# Patient Record
Sex: FEMALE | Race: WHITE | Employment: OTHER | URBAN - METROPOLITAN AREA
[De-identification: names, ages, dates, MRNs, and addresses within clinical notes are randomized per-mention and may not be internally consistent; named-entity substitution may affect disease eponyms.]

---

## 2018-07-27 PROBLEM — K43.9 EPIGASTRIC HERNIA: Status: ACTIVE | Noted: 2017-10-23

## 2018-07-27 PROBLEM — M81.0 AGE-RELATED OSTEOPOROSIS WITHOUT CURRENT PATHOLOGICAL FRACTURE: Status: ACTIVE | Noted: 2018-07-27

## 2018-07-27 PROBLEM — F90.2 ATTENTION DEFICIT HYPERACTIVITY DISORDER (ADHD), COMBINED TYPE: Status: ACTIVE | Noted: 2018-07-27

## 2018-07-27 PROBLEM — K58.0 IRRITABLE BOWEL SYNDROME WITH DIARRHEA: Status: ACTIVE | Noted: 2018-07-27

## 2018-07-27 PROBLEM — M51.36 DDD (DEGENERATIVE DISC DISEASE), LUMBAR: Status: ACTIVE | Noted: 2018-07-27

## 2018-07-27 PROBLEM — E53.8 B12 DEFICIENCY: Status: ACTIVE | Noted: 2018-07-27

## 2018-07-27 PROBLEM — M81.0 AGE-RELATED OSTEOPOROSIS WITHOUT CURRENT PATHOLOGICAL FRACTURE: Status: RESOLVED | Noted: 2018-07-27 | Resolved: 2018-07-27

## 2018-07-27 PROBLEM — M50.30 DDD (DEGENERATIVE DISC DISEASE), CERVICAL: Status: ACTIVE | Noted: 2018-07-27

## 2018-07-27 PROBLEM — J45.20 MILD INTERMITTENT ASTHMA WITHOUT COMPLICATION: Status: ACTIVE | Noted: 2018-07-27

## 2020-09-16 ENCOUNTER — APPOINTMENT (OUTPATIENT)
Dept: PHYSICAL THERAPY | Age: 65
End: 2020-09-16

## 2020-09-29 ENCOUNTER — APPOINTMENT (OUTPATIENT)
Dept: PHYSICAL THERAPY | Age: 65
End: 2020-09-29

## 2020-10-13 ENCOUNTER — HOSPITAL ENCOUNTER (OUTPATIENT)
Dept: PHYSICAL THERAPY | Age: 65
Discharge: HOME | End: 2020-10-13
Payer: MEDICARE

## 2020-10-13 PROCEDURE — 97161 PT EVAL LOW COMPLEX 20 MIN: CPT

## 2020-10-13 NOTE — THERAPY EVALUATION
Lola Almanza : 1955 [place] Secondary:  Therapy Center at [address] [place] [address] [address] [facility], [address] [place] [address],[address], [address] [facility] [phone]   [phone] OUTPATIENT PHYSICAL THERAPY:Initial Assessment 10/13/2020 ICD-10: Treatment Diagnosis: Pain in right knee (M25.561), Pain in left knee (M25.562) Precautions/Allergies: per EMR, allergic to Benadryl and Tramadol TREATMENT PLAN: 
Effective Dates: 10/13/2020 TO 2021 (90 days). Frequency/Duration: 2 times a week for 90 Day(s) MEDICAL/REFERRING DIAGNOSIS: 
R knee pain, L knee pain DATE OF ONSET: years ago REFERRING PHYSICIAN: Maria Luz Strickland.MD RUFFIN Orders: Yaima Marie and treat for B knee arthritis Return MD Appointment: 20 INITIAL ASSESSMENT:  Ms. Georgette Quezada is a 75year old female with B knee pain. She reports she is sent to this clinic because it has a pool and she cannot do land PT. She presents with reports of extreme pain, she has decreased ROM in B knees and she has decreased strength in LEs. She uses a rolling walker and has decreased functional mobility. She could benefit from PT to address deficits and work toward goals. PROBLEM LIST (Impacting functional limitations): 1. Decreased Strength 2. Decreased Ambulation Ability/Technique 3. Increased Pain 4. Decreased Flexibility/Joint Mobility INTERVENTIONS PLANNED: (Treatment may consist of any combination of the following) 1. Home Exercise Program (HEP) 2. Therapeutic Exercise/Strengthening 3. aquatic PT  
 
GOALS: (Goals have been discussed and agreed upon with patient.) Patient's stated goal is to reduce pain to \"4 or 5 without pain meds\". Short-Term Functional Goals: Time Frame: 6 weeks 1. Patient to be independent with HEP 2. Patient to tolerate 45 minute aquatic PT sessions Discharge Goals: Time Frame: 90 days 1. Patient to report pain as less than 5 without pain meds 2. Patient to improve LEFS score from 4 (at eval) to 20 to demo improved functional mobility 3. Patient to improve Oswestry score from 46 (at eval) to 30 OUTCOME MEASURE:  
Tool Used: Lower Extremity Functional Scale (LEFS) Score:  Initial: 4/80 Most Recent: X/80 (Date: -- ) Interpretation of Score: 20 questions each scored on a 5 point scale with 0 representing \"extreme difficulty or unable to perform\" and 4 representing \"no difficulty\". The lower the score, the greater the functional disability. 80/80 represents no disability. Minimal detectable change is 9 points. Tool Used: Modified Oswestry Low Back Pain Questionnaire Score:  Initial: 46/50  Most Recent: X/50 (Date: -- ) Interpretation of Score: Each section is scored on a 0-5 scale, 5 representing the greatest disability. The scores of each section are added together for a total score of 50. MEDICAL NECESSITY:  
· Patient demonstrates fair rehab potential due to higher previous functional level. REASON FOR SERVICES/OTHER COMMENTS: 
· Patient continues to require skilled intervention due to desire to decrease pain levels. Total Duration: 30 minutes PT Patient Time In/Time Out Time In: 2198 Time Out: 1625 Rehabilitation Potential For Stated Goals: Fair Regarding Mary Lagunas's therapy, I certify that the treatment plan above will be carried out by a therapist or under their direction. Thank you for this referral, 
 
Joseph Lama, PT Referring Physician Signature: Wilton Mccray MD _______________________________ Date _____________ PAIN/SUBJECTIVE:  
Initial: Pain Intensity 1: 5(5 currently on meds. 10 without meds)   Post Session:  Not rated at end of session HISTORY:  
History of Injury/Illness (Reason for Referral): 
Patient reports she has B knee pain and back pain. She ws to have a R TKA, but fell and shattered L knee. Reports she falls often.   She has a cast shoe on R foot due to broken foot but said MD allows her to take it off for aquatic PT. She had aquatic PT at [facility] for her back in the past.   
Past Medical History/Comorbidities: from EMR: Anxiety, Arthritis, Bipolar disorder, Chronic pain, Claustrophobia, Fibromyalgia, GERD, Hepatitis C, chronic, Psoriasis, Seizures, hx tumor removal; fracture, lap cholecystectomy; neurological procedure unlisted; cyst removal; mariela and bso; cervical diskectomy and endoscopy  ). Social History/Living Environment:  
  lives alone. Has care giver for 4 to 5 hours a few days per week. No steps. Prior Level of Function/Work/Activity: On disability Previous Treatment Approaches:   
      Had aquatic PT in the past for her back. Ambulatory/Rehab Services H2 Model Falls Risk Assessment Risk Factors: 
     (2)  Symptomatic Depression (1)  Dizziness/Vertigo (1)  Any administered benzodiazepines 
     (5)  History of Recent Falls [w/in 3 months] 
     (1)  Orthostatic drop in BP Ability to Rise from Chair: 
     (3)  Multiple attempts, but successful Falls Prevention Plan: 
     Physical Limitations to Exercise (specify):  monitor balance closely due to falls Mobility Assistance Device (specify):  rolling walker Total: (5 or greater = High Risk): 13  
©2010 [facility] . [facility] States Patent #[id number]. Federal Law prohibits the replication, distribution or use without written permission from [facility] Current Medications: per patient, EMR should be correct   buPROPion SR (Annika Hager)   dextroamphetamine-amphetamine (ADDERALL)   FLUoxetine (PROZAC)   lamoTRIgine (LAMICTAL)   traZODone (DESYREL)   HYDROcodone-acetaminophen ([address])   cyclobenzaprine (FLEXERIL)   Dexlansoprazole (DEXILANT)   promethazine (PHENERGAN)   Cholecalciferol, Vitamin D3,   
Gabriella Richi Date Last Reviewed:  10/13/20 Number of Personal Factors/Comorbidities that affect the Plan of Care: 1-2: MODERATE COMPLEXITY EXAMINATION:  
 
Observation/Orthostatic Postural Assessment: patient to PT with rolling walker. Cast shoe on R foot. Palpation: tender to touch B knees ROM:  AROM R knee flex 100, extn 0; L knee flex 110, extn -2 Strength: Difficult to accurately assess LE strength since pain was reported with everything. Neurological Screen: light touch intact in B LEs Functional Mobility:  Sit to/from Stand: with SBA. Transfer to mat with SBA. Ambulation with SBA. Balance:  Not tested but she reports poor balance and a history of frequent falls. Body Structures Involved: 1. Joints 2. Muscles Body Functions Affected: 1. Neuromusculoskeletal 
2. Movement Related Activities and Participation Affected: 1. General Tasks and Demands 2. Community, Social and [place] Number of elements (examined above) that affect the Plan of Care: 1-2: LOW COMPLEXITY CLINICAL PRESENTATION:  
Presentation: Evolving clinical presentation with changing clinical characteristics: MODERATE COMPLEXITY CLINICAL DECISION MAKING:  
Use of outcome tool(s) and clinical judgement create a POC that gives a: Difficult prediction of patient's progress: HIGH COMPLEXITY

## 2020-10-15 ENCOUNTER — APPOINTMENT (OUTPATIENT)
Dept: PHYSICAL THERAPY | Age: 65
End: 2020-10-15
Payer: MEDICARE

## 2020-10-19 ENCOUNTER — HOSPITAL ENCOUNTER (OUTPATIENT)
Dept: PHYSICAL THERAPY | Age: 65
Discharge: HOME | End: 2020-10-19
Payer: MEDICARE

## 2020-10-19 PROCEDURE — 97113 AQUATIC THERAPY/EXERCISES: CPT

## 2020-10-19 NOTE — PROGRESS NOTES
Hermelinda Garcia  : 1955  [place] / Product Type: Medicare /  [address] at [facility] MÓNICA ANSARI  [address] [place] [address], [address], [address] [place] [address]  [phone]   [phone]       OUTPATIENT PHYSICAL THERAPY: Daily Treatment Note 10/19/2020  Visit Count: 2     ICD-10: Treatment Diagnosis: Pain in right knee (M25.561), Pain in left knee (M25.562)   Precautions/Allergies: per EMR  Benadryl [diphenhydramine hcl] and Tramadol   TREATMENT PLAN:  Effective Dates: 10/19/2020 TO 10/13/2020 TO 2021 (90 days). .  Frequency/Duration:   2 times a week for 90 Day(s) Day(s) MEDICAL/REFERRING DIAGNOSIS:  Fibromyalgia R knee pain, L knee pain  DATE OF ONSET: years ago  REFERRING PHYSICIAN: Aldair Warren MD MD Orders: Rigoberto Linares and treat for B knee arthritis  Return MD Appointment: 20       Pre-treatment Symptoms/Complaints: Patient reports having more pain in back today. Pain: Initial: Pain Intensity 1: 5  Post Session: 5/10   Medications Last Reviewed:  10/13/20    Updated Objective Findings:   None Today     TREATMENT:   Aquatic Therapy (45 minutes): Aquatic treatment performed per flow grid for Ease of movement, Low impact and reduced weight bearing activity and increased pain level. .  Cues provided for proper posture. Assistance by therapist provided for lift chair. Patient has difficulty with prolonged walking, stairs and standing.      Aquatic Exercise Log       Date  10-19-20 Date   Date   Date   Date     Activity/ Exercise Parameters Parameters Parameters Parameters Parameters   Walking forward X 3       Walking backward        Walking sideways X 5         Marching X 2         Goose Step          Tip toes          Heels          Lunges        Side step squats        LE Exercises          Hip Flex/Ext 2 x 10         Hip Abd/Add 2 x 10         Hip flex/knee ext 2 x 10         Calf raises          Knee Flex          Squats          SLR 2 x 10         Step Ups        UE Exercises          Squeeze In          Push Down          Pull Down          Bicep/Tricep        Deep H2O/ Noodles          Stabilization          Arms only          Legs only        Reggie Xiao walk        Lower abdominal   work           Stretches          Hamstrings          Heelcords              HEP: None Today  [facility] Portal    Treatment/Session Summary:    · Response to Treatment:  Patient tolerated treatment well. Patient seemed pleased to be back in the pool. .  · Communication/Consultation:  None today  · Equipment provided today:  None today  · Recommendations/Intent for next treatment session: Next visit will focus on ambulation and balance.     Total Treatment Billable Duration:  45  PT Patient Time In/Time Out  Time In: 0400  Time Out: [address], PTA    Future Appointments   Date Time Provider Kimberly Juárez   10/21/2020  4:00 PM THIAGO GARCIA [facility]

## 2020-10-21 ENCOUNTER — HOSPITAL ENCOUNTER (OUTPATIENT)
Dept: PHYSICAL THERAPY | Age: 65
Discharge: HOME | End: 2020-10-21
Payer: MEDICARE

## 2020-10-21 PROCEDURE — 97113 AQUATIC THERAPY/EXERCISES: CPT

## 2020-10-21 NOTE — PROGRESS NOTES
Hermelinda Garcia  : 1955  [place] / Product Type: Medicare /  [address] at [facility] MÓNICA ANSARI  [address] [place] [address], [address], [place] [address].  [phone]   [phone]       OUTPATIENT PHYSICAL THERAPY: Daily Treatment Note 10/21/2020  Visit Count: 3     ICD-10: Treatment Diagnosis: Pain in right knee (M25.561), Pain in left knee (M25.562)   Precautions/Allergies: per EMR  Benadryl [diphenhydramine hcl] and Tramadol   TREATMENT PLAN:  Effective Dates: 10/19/2020 TO 10/13/2020 TO 2021 (90 days). .  Frequency/Duration:   2 times a week for 90 Day(s) Day(s) MEDICAL/REFERRING DIAGNOSIS:  Fibromyalgia R knee pain, L knee pain  DATE OF ONSET: years ago  REFERRING PHYSICIAN: Aldair Warren MD MD Orders: Rigoberto Linares and treat for B knee arthritis  Return MD Appointment: 20       Pre-treatment Symptoms/Complaints: Patient reports being sore after last treatment. She states mainly in her back. Pain: Initial: Pain Intensity 1: 5  Post Session: 5/10   Medications Last Reviewed:  10/13/20    Updated Objective Findings:   None Today     TREATMENT:   Aquatic Therapy (45 minutes): Aquatic treatment performed per flow grid for Ease of movement, Low impact and reduced weight bearing activity and increased pain level. .  Cues provided for proper posture. Assistance by therapist provided for lift chair. Patient has difficulty with prolonged walking, stairs and standing.      Aquatic Exercise Log       Date  10-19-20 Date  10-21-20 Date   Date   Date     Activity/ Exercise Parameters Parameters Parameters Parameters Parameters   Walking forward X 3 X 3      Walking backward        Walking sideways X 5 X 5        Marching X 2 x3        Goose Step          Tip toes          Heels          Lunges        Side step squats        LE Exercises          Hip Flex/Ext 2 x 10 2 x 10        Hip Abd/Add 2 x 10 2 x 10        Hip flex/knee ext 2 x 10 2 x 10 Calf raises          Knee Flex          Squats          SLR 2 x 10 2 x10        Step Ups        UE Exercises          Squeeze In          Push Down          Pull Down          Bicep/Tricep        Deep H2O/ Noodles          Stabilization          Arms only          Legs only        Reggie Xiao walk        Lower abdominal   work           Stretches          Hamstrings          Heelcords              HEP: None Today  [facility] Portal    Treatment/Session Summary:    · Response to Treatment:  Patient tolerated treatment with mild discomfort today. Patient indicated that she had to take more of her pain medication today due to the increased pain in her hip and back. Patient enjoys the pool. .  · Communication/Consultation:  None today  · Equipment provided today:  None today  · Recommendations/Intent for next treatment session: Next visit will focus on ambulation and balance. Total Treatment Billable Duration:  45  PT Patient Time In/Time Out  Time In: 0400  Time Out: [address], PTA    No future appointments.

## 2020-11-13 ENCOUNTER — APPOINTMENT (OUTPATIENT)
Dept: PHYSICAL THERAPY | Age: 65
End: 2020-11-13

## 2020-11-20 ENCOUNTER — APPOINTMENT (OUTPATIENT)
Dept: PHYSICAL THERAPY | Age: 65
End: 2020-11-20

## 2020-12-17 NOTE — THERAPY DISCHARGE
Emily Monroe : 1955 [place] Secondary:  Therapy Center at [facility] MÓNICA ANSARI [address] [facility], [address] [place] [address],[address], [place] [address]. [phone]   [phone] OUTPATIENT PHYSICAL THERAPY:Discontinuation Summary 2020 ICD-10: Treatment Diagnosis: Pain in right knee (M25.561), Pain in left knee (M25.562) Precautions/Allergies: per EMR, allergic to Benadryl and Tramadol TREATMENT PLAN: Discontinue PT Patient attended 2 PT sessions on 10/13/20 and 10/21/20. MEDICAL/REFERRING DIAGNOSIS: 
R knee pain, L knee pain DATE OF ONSET: years ago REFERRING PHYSICIAN: Kierra Keller MD MD Orders: Hieu Links and treat for B knee arthritis Return MD Appointment: 20 ASSESSMENT:  Ms. Rey Foley is a 75year old female with B knee pain. She reported she was sent to this clinic because it has a pool and she cannot do land PT. She presented with reports of extreme pain, she had decreased ROM in B knees and she had decreased strength in LEs. She used a rolling walker and had decreased functional mobility. She did not return to PT after 2 visits and she will now be discontinued from PT.   
 
GOALS: (Goals have been discussed and agreed upon with patient.) Unable to assess progress toward goals. Patient's stated goal is to reduce pain to \"4 or 5 without pain meds\". Short-Term Functional Goals: Time Frame: 6 weeks 1. Patient to be independent with HEP 2. Patient to tolerate 45 minute aquatic PT sessions Discharge Goals: Time Frame: 90 days 1. Patient to report pain as less than 5 without pain meds 2. Patient to improve LEFS score from 4 (at eval) to 20 to demo improved functional mobility 3. Patient to improve Oswestry score from 46 (at eval) to 30 OUTCOME MEASURE:  
Tool Used: Lower Extremity Functional Scale (LEFS) Score:  Initial:  Most Recent:  (Date: -- ) Interpretation of Score: 20 questions each scored on a 5 point scale with 0 representing \"extreme difficulty or unable to perform\" and 4 representing \"no difficulty\". The lower the score, the greater the functional disability. 80/80 represents no disability. Minimal detectable change is 9 points. Tool Used: Modified Oswestry Low Back Pain Questionnaire Score:  Initial: 46/50  Most Recent: X/50 (Date: -- ) Interpretation of Score: Each section is scored on a 0-5 scale, 5 representing the greatest disability. The scores of each section are added together for a total score of 50. From initial evaluation PAIN/SUBJECTIVE:  
Initial: Pain Intensity 1: 5(5 currently on meds. 10 without meds)   Post Session:  Not rated at end of session HISTORY:  
History of Injury/Illness (Reason for Referral): 
Patient reports she has B knee pain and back pain. She ws to have a R TKA, but fell and shattered L knee. Reports she falls often. She has a cast shoe on R foot due to broken foot but said MD allows her to take it off for aquatic PT. She had aquatic PT at [facility] for her back in the past.   
Past Medical History/Comorbidities: from EMR: Anxiety, Arthritis, Bipolar disorder, Chronic pain, Claustrophobia, Fibromyalgia, GERD, Hepatitis C, chronic, Psoriasis, Seizures, hx tumor removal; fracture, lap cholecystectomy; neurological procedure unlisted; cyst removal; mariela and bso; cervical diskectomy and endoscopy  ). Social History/Living Environment:  
  lives alone. Has care giver for 4 to 5 hours a few days per week. No steps. Prior Level of Function/Work/Activity: On disability Previous Treatment Approaches:   
      Had aquatic PT in the past for her back. Current Medications: per patient, EMR should be correct   buPROPion SR (Archie Ruffini)   dextroamphetamine-amphetamine (ADDERALL)   FLUoxetine (PROZAC)   lamoTRIgine (LAMICTAL)   traZODone (DESYREL)   HYDROcodone-acetaminophen ([address])   cyclobenzaprine (FLEXERIL)   Dexlansoprazole (DEXILANT)   promethazine (PHENERGAN)   Cholecalciferol, Vitamin D3,   
Travis Everettlulu Date Last Reviewed:  10/13/20 EXAMINATION:  
 
Observation/Orthostatic Postural Assessment: patient to PT with rolling walker. Cast shoe on R foot. Palpation: tender to touch B knees ROM:  AROM R knee flex 100, extn 0; L knee flex 110, extn -2 Strength: Difficult to accurately assess LE strength since pain was reported with everything. Neurological Screen: light touch intact in B LEs Functional Mobility:  Sit to/from Stand: with SBA. Transfer to mat with SBA. Ambulation with SBA. Balance:  Not tested but she reports poor balance and a history of frequent falls.

## 2021-03-23 ENCOUNTER — APPOINTMENT (OUTPATIENT)
Dept: PHYSICAL THERAPY | Age: 66
End: 2021-03-23
Payer: MEDICARE

## 2021-03-30 ENCOUNTER — HOSPITAL ENCOUNTER (OUTPATIENT)
Dept: PHYSICAL THERAPY | Age: 66
Discharge: HOME | End: 2021-03-30
Payer: MEDICARE

## 2021-03-30 PROCEDURE — 97162 PT EVAL MOD COMPLEX 30 MIN: CPT

## 2021-03-30 NOTE — THERAPY EVALUATION
Sara Clark : 1955 [place]: [place] [address]01 [facility], [address] [place] [address],[address], [facility] [address]. [phone]   [phone] OUTPATIENT PHYSICAL THERAPY:Initial Assessment 3/30/2021 ICD-10: Treatment Diagnosis: Gait instability R26.81, Pain in right knee (M25.561), Pain in left knee (M25.562) Precautions/Allergies: per EMR, allergic to Benadryl and Tramadol TREATMENT PLAN:  
Effective Dates: 3/30/2021 TO 2021 (90 days). Frequency/Duration: 1-2 times a week for 90 Day(s) MEDICAL/REFERRING DIAGNOSIS: 
gait DATE OF ONSET:  REFERRING PHYSICIAN: Wojciech Paiz NP MD Orders: evaluate and treat for gait and balance Return MD Appointment: TBD INITIAL ASSESSMENT:  Ms. Shira Mosher is a 77year old female with difficulty walking. She had been to PT for aquatic PT last year but only attended 2 sessions. She presents with high pain ratings, decreased balance, decreased flexibility and decreased strength. She has a history of falls. She may benefit from PT to address deficits and work toward goals. Patient feels she cannot tolerate land therapy initially and wishes to start with aquatic PT. PROBLEM LIST (Impacting functional limitations): 1. Decreased Strength 2. Decreased ADL/Functional Activities 3. Decreased Transfer Abilities 4. Decreased Ambulation Ability/Technique 5. Decreased Balance 6. Increased Pain 7. Decreased Flexibility/Joint Mobility INTERVENTIONS PLANNED: (Treatment may consist of any combination of the following) 1. Balance Exercise 2. Gait Training 3. Home Exercise Program (HEP) 4. Manual Therapy 5. Therapeutic Exercise/Strengthening 6. aquatic PT  
 
GOALS: (Goals have been discussed and agreed upon with patient.) Patient's stated goal is to decrease the pain in her knees and back and to stop falling.   
Short-Term Functional Goals: Time Frame: 6 weeks 1. Patient to be independent with HEP 2. Patient to tolerate 45 minute aquatic PT sessions Discharge Goals: Time Frame: 90 days 1. Patient to ambulate household distances without assistive device 2. Patient to report that pain levels decrease from current 8/10 to <= 4/10 3. Patient to improve LEFS score to 40 to demo improved functional mobility OUTCOME MEASURE:  
Tool Used: Lower Extremity Functional Scale (LEFS) Score:  Initial: 1/80 Most Recent: X/80 (Date: -- ) Interpretation of Score: 20 questions each scored on a 5 point scale with 0 representing \"extreme difficulty or unable to perform\" and 4 representing \"no difficulty\". The lower the score, the greater the functional disability. 80/80 represents no disability. Minimal detectable change is 9 points. Tool Used: Modified Oswestry Low Back Pain Questionnaire Score:  Initial: 39/50  Most Recent: X/50 (Date: -- ) Interpretation of Score: Each section is scored on a 0-5 scale, 5 representing the greatest disability. The scores of each section are added together for a total score of 50. MEDICAL NECESSITY:  
· Patient demonstrates guarded rehab potential due to higher previous functional level. REASON FOR SERVICES/OTHER COMMENTS: 
· Patient continues to require skilled intervention due to need to return to higher level of function. Total Duration:  45 minutes PT Patient Time In/Time Out Time In: 0935 Time Out: 1020 Rehabilitation Potential For Stated Goals: guarded Regarding Hieu Lagunas's therapy, I certify that the treatment plan above will be carried out by a therapist or under their direction. Thank you for this referral, 
 
Shreya Luna, PT Referring Physician Signature: Merlin Canada NP _______________________________ Date _____________ PAIN/SUBJECTIVE:  
Initial: Pain Intensity 1: 9(\"8 or 9\")   Post Session:  Pain not rated at end of session HISTORY:  
History of Injury/Illness (Reason for Referral): 
Patient reports she has been falling since 2014 when she had spine surgery. She cannot drive and has a caretaker 4 days per week. She came to PT last Fall for 2 visits but stopped because she couldn't get her care giver to bring her. Past Medical History/Comorbidities: From EMR: Anxiety, Arthritis, Bipolar disorder ([address] Chronic pain, Claustrophobia, Fibromyalgia, GERD, chronic Hepatitis C, Psoriasis, Seizures, tumor removal; lap cholecystectomy; neurological procedure unlisted; cyst removal; mariela and bso; cervical diskectomy, and hx endoscopy Social History/Living Environment:  
 *lives in one story home. Has caregiver for 4 to 6 hours Monday thru Thursday. Prior Level of Function/Work/Activity: Not working Previous Treatment Approaches:   
      Came to PT last Fall, but stopped after 2 sessions. Ambulatory/Rehab Services H2 Model Falls Risk Assessment Risk Factors: 
     (2)  Symptomatic Depression (1)  Altered Elimination (1)  Dizziness/Vertigo (2)  Any administered antiepileptics/anticonvulsants (1)  Any administered benzodiazepines 
     (5)  History of Recent Falls [w/in 3 months] Ability to Rise from Chair: 
     (3)  Multiple attempts, but successful Falls Prevention Plan: 
     Physical Limitations to Exercise (specify): She fatigues very easily Mobility Assistance Device (specify):  rolling walker Total: (5 or greater = High Risk): 15  
©2010 [facility] of [facility] Patent #[id number]. Federal Law prohibits the replication, distribution or use without written permission from [facility] of [place] Current Medications: Wllbutrin, Adderall, Prozac, Lamictal, Trazadone, Norco, Flexeril, Dexilant, D3, Klonopin, C,   
Date Last Reviewed:  3/30/21 Number of Personal Factors/Comorbidities that affect the Plan of Care: 1-2: MODERATE COMPLEXITY EXAMINATION:  
 
Observation/Orthostatic Postural Assessment: patient using rolling walker to come to PT. Care giver drove her. Palpation: not tested today ROM: R knee AROM 0 - 108. L knee AROM 1-88 degrees Strength: weak throughout. Grossly 4- in B hips. 4 to 4+ in knees, decreased trunk control. Special Tests: none Neurological Screen:light touch intact in B Les. Functional Mobility:  Sit to/from Stand: with SBA, multiple attempts. Min to Mod assist with sit to/from supine Balance:  SLS 1-2 seconds each leg. Very unsteady. Body Structures Involved: 1. Joints 2. Muscles Body Functions Affected: 1. Neuromusculoskeletal 
2. Movement Related Activities and Participation Affected: 1. General Tasks and Demands 2. Mobility 3. Community, Social and [place] Number of elements (examined above) that affect the Plan of Care: 3: MODERATE COMPLEXITY CLINICAL PRESENTATION:  
Presentation: Evolving clinical presentation with changing clinical characteristics: MODERATE COMPLEXITY CLINICAL DECISION MAKING:  
Use of outcome tool(s) and clinical judgement create a POC that gives a: Difficult prediction of patient's progress: HIGH COMPLEXITY

## 2021-04-05 ENCOUNTER — HOSPITAL ENCOUNTER (OUTPATIENT)
Dept: PHYSICAL THERAPY | Age: 66
Discharge: HOME | End: 2021-04-05
Payer: MEDICARE

## 2021-04-05 PROCEDURE — 97113 AQUATIC THERAPY/EXERCISES: CPT

## 2021-04-05 NOTE — PROGRESS NOTES
Jazz Wan : 1955 [place] / Product Type: Medicare /  [address]  at [facility] MÓNICA ANSARI 1101 [facility], [address] [place] [address],[address], [facility] [address]. [phone]   [phone] OUTPATIENT PHYSICAL THERAPY: Daily Treatment Note 2021 Visit Count: 2 MEDICAL/REFERRING DIAGNOSIS: 
gait *** DATE OF ONSET: *** REFERRING PHYSICIAN: Roland Smith NP MD Orders: *** Return MD Appointment: *** Pre-treatment Symptoms/Complaints:  Pt with complaints of pain in her knees today. Pain: Initial:   not rated Post Session:  Not rated Medications Last Reviewed: On eval 
 
Updated Objective Findings:  
None Today TREATMENT:  
Aquatic Therapy (45 minutes): Aquatic treatment performed per flow grid for Decreased muscle strength, Decreased endurance, Decreased activity endurance, Decompression and Low impact and reduced weight bearing activity. Cues provided for posture and exercises. Aquatic Exercise Log Date 21 Date Date Date Date Activity/ Exercise Parameters Parameters Parameters Parameters Parameters Walking forward 4 Walking backward 4 Walking sideways 4 Marching 4 Goose Step Tip toes Heels Lunges Side step squats LE Exercises 3. 5' holding rail Hip Flex/Ext 10 Hip Abd/Add 10 Hip IR/ER Calf raises Knee Flex Squats Leg Circles 10/10 Step Ups UE Exercises Squeeze In Push Down Pull Down Bicep/Tricep Rows/Press outs  Chi Positions Trunk Rotation Deep H2O/ Noodles Seated on bench Stabilization Arms only Legs only Bicycle 2 min [place] 2 min Scissors 2 min Crab walk Lower abdominal  
work  LAQs 2 min Cardio Jogging Lap  
Swimming Stretches Hamstrings Heelcords Piriformis Neck HEP: per initial evaluation MedBridge Portal 
 
Treatment/Session Summary: · Response to Treatment:  Pt tolerated exercises well today. She is very unsteady with gait and exercises. · Communication/Consultation:  None today · Equipment provided today:  None today · Recommendations/Intent for next treatment session: Next visit will focus on progressing exercises as tolerated. Total Treatment Billable Duration:  45 minutes PT Patient Time In/Time Out Time In: 1130 Time Out: 7183 Lashaun Hunter PTA Future Appointments Date Time Provider Kimberly Juárez 4/7/2021  1:00 PM Sagrario Mosqueda, VLAD HARO  
4/8/2021 10:00 AM [address] THRU [facility] IMD IMD  
4/8/2021  1:00 PM [place] PHONE APPOINTMENT WILLY OSBORNE OR PRE A  
4/12/2021 11:00 AM KARLOS Melo MILLGWENIUM  
4/19/2021 11:15 AM KARLOS MeloIUM  
4/26/2021 11:15 AM KARLOS Melo  
4/28/2021 11:15 AM VLAD ChoIUM

## 2021-04-07 ENCOUNTER — HOSPITAL ENCOUNTER (OUTPATIENT)
Dept: PHYSICAL THERAPY | Age: 66
Discharge: HOME | End: 2021-04-07
Payer: MEDICARE

## 2021-04-12 ENCOUNTER — HOSPITAL ENCOUNTER (OUTPATIENT)
Dept: PHYSICAL THERAPY | Age: 66
Discharge: HOME | End: 2021-04-12
Payer: MEDICARE

## 2021-04-12 NOTE — PROGRESS NOTES
Ursula Martinez  : 1955  [place]  Secondary: [place] [facility], [address], [place] [address].  [phone]   [phone]       OUTPATIENT PHYSICAL [address] 2021     ICD-10: Treatment Diagnosis: Gait instability R26.81, Pain in right knee (M25.561), Pain in left knee (M25.562)  Precautions/Allergies: per EMR, allergic to Benadryl and Tramadol    TREATMENT PLAN:   Effective Dates: 3/30/2021 TO 2021 (90 days).   Frequency/Duration: 1-2 times a week for 90 Day(s) MEDICAL/REFERRING DIAGNOSIS:  gait    DATE OF ONSET:   REFERRING PHYSICIAN: Rodrick Craven NP MD Orders: evaluate and treat for gait and balance  Return MD Appointment: TBD   Patient called to cancel scheduled appointment due to she was \"unable to come to PT today\"

## 2021-04-19 ENCOUNTER — APPOINTMENT (OUTPATIENT)
Dept: PHYSICAL THERAPY | Age: 66
End: 2021-04-19
Payer: MEDICARE

## 2021-04-26 ENCOUNTER — APPOINTMENT (OUTPATIENT)
Dept: PHYSICAL THERAPY | Age: 66
End: 2021-04-26
Payer: MEDICARE

## 2021-04-28 ENCOUNTER — APPOINTMENT (OUTPATIENT)
Dept: PHYSICAL THERAPY | Age: 66
End: 2021-04-28
Payer: MEDICARE

## 2021-07-12 ENCOUNTER — HOSPITAL ENCOUNTER (OUTPATIENT)
Dept: PHYSICAL THERAPY | Age: 66
Discharge: HOME | End: 2021-07-12
Payer: MEDICARE

## 2021-07-12 PROCEDURE — 97110 THERAPEUTIC EXERCISES: CPT

## 2021-07-12 PROCEDURE — 97162 PT EVAL MOD COMPLEX 30 MIN: CPT

## 2021-07-12 NOTE — PROGRESS NOTES
Sylvia Yepez  : 1955  [place] / Product Type: Medicare /  [address] at [facility] MÓNICA ANSARI  [address] [facility], [address], [place] [address].  [phone]   [phone]       OUTPATIENT PHYSICAL THERAPY: Daily Treatment Note 2021  Visit Count: 1     ICD-10: Treatment Diagnosis: right knee pain ( M25.561), left knee pain (M25.562), unsteadiness on feet (R26.81)  Precautions/Allergies: fall, possible seizures/  Benadryl [diphenhydramine hcl] and Tramadol   TREATMENT PLAN:  Effective Dates: 2021 TO 10/10/2021 (90 days). Frequency/Duration: 1 to 2 times a week for 90 Day(s) MEDICAL/REFERRING DIAGNOSIS:  bilat knee pain   DATE OF ONSET: 6 months  REFERRING PHYSICIAN: Esau Vergara MD MD Orders: PT- evaluate and treat  Return MD Appointment: unsure       Pre-treatment Symptoms/Complaints:  Pt complains of both knees hurting with the left one being worse and causing her to be really unsteady  Pain: Initial:   no number given Post Session:  No number given   Medications Last Reviewed:  21    Updated Objective Findings:   See evaluation     TREATMENT:   Therapeutic Exercise: (12 Minutes):  Exercises to improve mobility and strength. Required maximal verbal and tactile cues to exercise technique. reviewed and issued HEP: bridging, LTR, B SLR, hooklying hip ER against tband, LAQ. Treatment/Session Summary:    · Response to Treatment:  Pt needed frequent repetitive cueing for exercise. Good support from home care nurse. · Communication/Consultation:  None today  · Equipment provided today:  Adjusted height and angle of walker for improved safety  · Recommendations/Intent for next treatment session: Next visit will focus on HEP review. Total Treatment Billable Duration:  12 minutes in addition to evaluation  PT Patient Time In/Time Out  Time In: 1120  Time Out: 087 [facility], PT    No future appointments.

## 2021-07-12 NOTE — THERAPY EVALUATION
Brandon Chan  : 1955  [place]  [place]: [place] Of [facility] at [facility] MÓNICA DURANDA  1101 [facility], [address] [place] [address],[address]Th [address] 7[address], [facility] [address].  [phone]   [phone]       OUTPATIENT PHYSICAL THERAPY:Initial Assessment 2021   ICD-10: Treatment Diagnosis: right knee pain ( M25.561), left knee pain (M25.562), unsteadiness on feet (R26.81)  Precautions/Allergies: fall, possible seizures/Benadryl [diphenhydramine hcl] and Tramadol   TREATMENT PLAN:  Effective Dates: 2021 TO 10/10/2021 (90 days). Frequency/Duration: 1 to 2 times a week for 90 Day(s) MEDICAL/REFERRING DIAGNOSIS:  bilat knee pain   DATE OF ONSET: 3 months ago  REFERRING PHYSICIAN: Letty Salas MD MD Orders: PT- evaluate and treat  Return MD Appointment: unsure     INITIAL ASSESSMENT:  Ms. Rolland Gosselin comes to therapy with complaints of right low back, right hip and bilateral knee pain that is making her feel more unsteady. She reports waking up on the floor and thinks she may have injured herself then. She presents with B knee stiffness due to pain, significant weakness in her core and BLE all making her lose her balance with very little challenge during gait or transitional movements. Assistance was needed for bed mobility transfers. She needed frequent cueing to stay on task but was able to answer questions. Nurse reported pt having moments of confusion. She needs to be able to safely ambulate house hold distances and perform basic ADL's without assistance and ambulate community distances using a rolling walker with less risk of falling. She will benefit from skilled therapy to address her deficits and assist in returning to independent ADL's with less pain or chance of falling. PROBLEM LIST (Impacting functional limitations):  1. Decreased Strength  2. Decreased ADL/Functional Activities  3. Decreased Transfer Abilities  4.  Decreased Ambulation Ability/Technique  5. Decreased Balance  6. Increased Pain  7. Decreased Activity Tolerance  8. Decreased Flexibility/Joint Mobility INTERVENTIONS PLANNED: (Treatment may consist of any combination of the following)  1. Balance Exercise  2. Gait Training  3. Home Exercise Program (HEP)  4. Manual Therapy  5. Neuromuscular Re-education/Strengthening  6. Range of Motion (ROM)  7. Therapeutic Exercise/Strengthening     GOALS: (Goals have been discussed and agreed upon with patient.)  Short-Term Functional Goals: Time Frame: 3 weeks     1. Independent HEP with supervision from home care nurse     2. B knee flexion AROM to 0 deg to allow safe mobility      3. Pt reporting 50% fewer falls     Discharge Goals: Time Frame: 90 days     1. Tolerate gait > 25 minutes for community integration     2. ADL's with pain < 3/10     3. BLE Strength at least 4+/5 to allow safe transfers/ mobility     4. LEFS score increased by at least 15 points to demonstrate improved function    OUTCOME MEASURE:   Tool Used: Lower Extremity Functional Scale (LEFS)  Score:  Initial: 6/80 Most Recent: X/80 (Date: -- )   Interpretation of Score: 20 questions each scored on a 5 point scale with 0 representing \"extreme difficulty or unable to perform\" and 4 representing \"no difficulty\". The lower the score, the greater the functional disability. 80/80 represents no disability. Minimal detectable change is 9 points. MEDICAL NECESSITY:   · Skilled intervention continues to be required due to need for guided exercise for balance and strength. REASON FOR SERVICES/OTHER COMMENTS:  · Patient continues to require skilled intervention due to need for pt to be independent with ADL's with less risk of falling.   Total Duration:  PT Patient Time In/Time Out  Time In: 1120  Time Out: 1210    Rehabilitation Potential For Stated Goals: [address] therapy, I certify that the treatment plan above will be carried out by a therapist or under their direction. Thank you for this referral,  Rafael Munoz, PT,MSPT     Referring Physician Signature: Kristen Archibald MD _______________________________ Date _____________      PAIN/SUBJECTIVE:   Initial:   6/10  Pt wonders if she had a seizure because she didn't know how she got on the floor Post Session: no number given or reports of new pain   HISTORY:   History of Injury/Illness (Reason for Referral):  Pt reports falling less overall but did fall last week for no know reason. Now with right hip (pointing to greater trochanteric region) and B knee pain. Past Medical History/Comorbidities:   Ms. Sheral Halsted  has a past medical history of Anxiety, Arthritis, Bipolar disorder ([address] Chronic pain, Claustrophobia, Fibromyalgia, Hepatitis C,  Psoriasis, and Seizures ( feels she may have had one when she fell this last time, otherwise about 4 years ago). Ms. 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 Social History/Living Environment:     lives in first floor apartment with nursing assistant comes 3 time a week to help with dressing, ADL's and driving to doctor appointments  Prior Level of Function/Work/Activity:  Enjoys watching TV  Previous Treatment Approaches:          PT- in the past couple of years for gait and balance  Personal Factors: Other factors that influence how disability is experienced by the patient:  Episodes of confusion per home nurse; decrease balance    Ambulatory/Rehab Services H2 Model Falls Risk Assessment   Risk Factors:       (4)  Confusion/Disorientation/Impulsivity       (5)  History of Recent Falls [w/in 3 months] Ability to Rise from Chair:       (3)  Multiple attempts, but successful   Falls Prevention Plan:       Exercise/Equipment Adaptation (specify):  see documentation   Total: (5 or greater = High Risk): 12   ©2010 AHI of [address].  [place] Patent [id number]. Federal Law prohibits the replication, distribution or use without written permission from [facility] soup.me [facility]   Current Medications:       Current Outpatient Medications:     buPROPion SR (WELLBUTRIN, ZYBAN) 200 mg SR tablet, TAKE 1 TABLET DAILY IN THE MORNING AND 1 TAB DAILY AT NOON, Disp: , Rfl: 2    dextroamphetamine-amphetamine (ADDERALL) 30 mg tablet, TAKE 1 TABLET BY MOUTH TWICE A DAY, Disp: , Rfl: 0    FLUoxetine (PROZAC) 40 mg capsule, TAKE ONE CAPSULE BY MOUTH DAILY, Disp: , Rfl: 3    lamoTRIgine (LAMICTAL) 100 mg tablet, Take 1 Tab by mouth daily. , Disp: , Rfl: 2    traZODone (DESYREL) 50 mg tablet, TAKE 1 TO 2 TABLETS DAILY AT BEDTIME AS NEEDED, Disp: , Rfl: 2    HYDROcodone-acetaminophen (NORCO) 7.5-325 mg per tablet, Take 1 Tab by mouth two (2) times daily as needed for Pain., Disp: , Rfl:     cyclobenzaprine (FLEXERIL) 10 mg tablet, Take 10 mg by mouth three (3) times daily as needed for Muscle Spasm(s). , Disp: , Rfl:     Dexlansoprazole (DEXILANT) 60 mg CpDM, Take 1 Cap by mouth two (2) times a day. Take day of surgery per anesthesia protocol. Indications: EROSIVE ESOPHAGITIS, GASTROESOPHAGEAL REFLUX, Disp: , Rfl:     Cholecalciferol, Vitamin D3, 50,000 unit cap, Take 100,000 Caps by mouth every seven (7) days. , Disp: , Rfl:     KLONOPIN 2 mg Tab, Take 2 mg by mouth three (3) times daily. Take day of surgery per anesthesia protocol. 1-2 tabs BID Anxiety  Indications: Panic Disorder, Disp: , Rfl:   *  Medication to decrease coughing at night  *  Medication for IBS   Date Last Reviewed:  7/12/21   Number of Personal Factors/Comorbidities that affect the Plan of Care: 1-2: MODERATE COMPLEXITY   EXAMINATION:   Observation/Orthostatic Postural Assessment:          Arrived with rolling walker adjusted too high.  Very unsteady with sit to stand and initiation of ambulation  Palpation:          Tender over right greater trochanteric region, over entire B knees  ROM: LLE AROM  L Knee Flexion: 94  L Knee Extension: 0    RLE AROM  R Knee Flexion: 105  R Knee Extension: 2 (hyper extn)               Strength:           Pain with testing. Core 3+/5     LLE Strength  L Hip Flexion: 4  L Hip Extension: 4-  L Hip ABduction: 4-  L Knee Extension: 4-  L Ankle Plantar Flexion: 4    RLE Strength  R Hip Flexion: 4+  R Hip Extension: 4  R Hip ABduction: 4-  R Hip Internal Rotation: 4-  R Hip External Rotation: 4-  R Knee Extension: 4  R Ankle Plantar Flexion: 4         Balance: static standing:able to accept only minimal pertubation to trunk before loss of balance                    Loss of balance with direction changes and during transitional movements     Body Structures Involved:  1. Nerves  2. Joints  3. Muscles  4. Ligaments Body Functions Affected:  1. Sensory/Pain  2. Neuromusculoskeletal  3. Movement Related Activities and Participation Affected:  1. General Tasks and Demands  2. Mobility  3. Self Care  4. Domestic Life  5.  Community, Social and [place]   Number of elements (examined above) that affect the Plan of Care: 3: MODERATE COMPLEXITY   CLINICAL PRESENTATION:   Presentation: Evolving clinical presentation with changing clinical characteristics: MODERATE COMPLEXITY   CLINICAL DECISION MAKING:   Use of outcome tool(s) and clinical judgement create a POC that gives a: Questionable prediction of patient's progress: MODERATE COMPLEXITY

## 2021-08-30 ENCOUNTER — APPOINTMENT (OUTPATIENT)
Dept: PHYSICAL THERAPY | Age: 66
End: 2021-08-30

## 2021-09-03 ENCOUNTER — HOSPITAL ENCOUNTER (OUTPATIENT)
Dept: PHYSICAL THERAPY | Age: 66
End: 2021-09-03

## 2021-09-07 ENCOUNTER — APPOINTMENT (OUTPATIENT)
Dept: PHYSICAL THERAPY | Age: 66
End: 2021-09-07

## 2021-09-09 ENCOUNTER — APPOINTMENT (OUTPATIENT)
Dept: PHYSICAL THERAPY | Age: 66
End: 2021-09-09

## 2021-09-14 ENCOUNTER — APPOINTMENT (OUTPATIENT)
Dept: PHYSICAL THERAPY | Age: 66
End: 2021-09-14

## 2021-09-16 ENCOUNTER — APPOINTMENT (OUTPATIENT)
Dept: PHYSICAL THERAPY | Age: 66
End: 2021-09-16

## 2021-09-22 NOTE — THERAPY EVALUATION
Caty Marroquin  : 1955  [place]  [place] [facility], [address], [place] [address].  [phone]   [phone]       OUTPATIENT PHYSICAL THERAPY:Initial Assessment and Discharge Summary 2021   ICD-10: Treatment Diagnosis: right knee pain ( M25.561), left knee pain (M25.562), unsteadiness on feet (R26.81)  Precautions/Allergies: fall, possible seizures/Benadryl [diphenhydramine hcl] and Tramadol   PLAN:  Discharge due to pt not returning to therapy MEDICAL/REFERRING DIAGNOSIS:  bilat knee pain   DATE OF ONSET: 3 months ago  REFERRING PHYSICIAN: April Gardner MD MD Orders: PT- evaluate and treat  Return MD Appointment: unsure     INITIAL ASSESSMENT:  Ms. Pato Farrell came to therapy with complaints of right low back, right hip and bilateral knee pain that is making her feel more unsteady. She reported waking up on the floor and thinks she may have injured herself then. She presents with B knee stiffness due to pain, significant weakness in her core and BLE all making her lose her balance with very little challenge during gait or transitional movements. Assistance was needed for bed mobility transfers. She needed frequent cueing to stay on task but was able to answer questions. Nurse reported pt having moments of confusion. She needs to be able to safely ambulate house hold distances and perform basic ADL's without assistance and ambulate community distances using a rolling walker with less risk of falling. Discontinuation Assessment: unable to assess due to patient not returning to therapy. Order for pool therapy received 7/15/21, after initial evaluation. PROBLEM LIST (Impacting functional limitations):  1. Decreased Strength  2. Decreased ADL/Functional Activities  3. Decreased Transfer Abilities  4. Decreased Ambulation Ability/Technique  5.  Decreased Balance  6. Increased Pain  7. Decreased Activity Tolerance  8. Decreased Flexibility/Joint Mobility INTERVENTIONS PLANNED: (Treatment may consist of any combination of the following)  1. Balance Exercise  2. Gait Training  3. Home Exercise Program (HEP)  4. Manual Therapy  5. Neuromuscular Re-education/Strengthening  6. Range of Motion (ROM)  7. Therapeutic Exercise/Strengthening     GOALS: (Goals have been discussed and agreed upon with patient.)- unable to assess due to pt not returning to therapy                  Short-Term Functional Goals: Time Frame: 3 weeks     1. Independent HEP with supervision from home care nurse     2. B knee flexion AROM to 0 deg to allow safe mobility      3. Pt reporting 50% fewer falls     Discharge Goals: Time Frame: 90 days     1. Tolerate gait > 25 minutes for community integration     2. ADL's with pain < 3/10     3. BLE Strength at least 4+/5 to allow safe transfers/ mobility     4. LEFS score increased by at least 15 points to demonstrate improved function    OUTCOME MEASURE:   Tool Used: Lower Extremity Functional Scale (LEFS)  Score:  Initial: 6/80 Most Recent: unable to assess   Interpretation of Score: 20 questions each scored on a 5 point scale with 0 representing \"extreme difficulty or unable to perform\" and 4 representing \"no difficulty\". The lower the score, the greater the functional disability. 80/80 represents no disability. Minimal detectable change is 9 points. Thank you for this referral,  Keyla Ledesma, PT,MSPT             PAIN/SUBJECTIVE: at evaluation   Initial:   6/10  Pt wonders if she had a seizure because she didn't know how she got on the floor Post Session: no number given or reports of new pain      EXAMINATION: at evaluation   Observation/Orthostatic Postural Assessment:          Arrived with rolling walker adjusted too high.  Very unsteady with sit to stand and initiation of ambulation  Palpation:          Tender over right greater trochanteric region, over entire B knees  ROM:               LLE AROM  L Knee Flexion: 94  L Knee Extension: 0    RLE AROM  R Knee Flexion: 105  R Knee Extension: 2 (hyper extn)               Strength:           Pain with testing.  Core 3+/5     LLE Strength  L Hip Flexion: 4  L Hip Extension: 4-  L Hip ABduction: 4-  L Knee Extension: 4-  L Ankle Plantar Flexion: 4    RLE Strength  R Hip Flexion: 4+  R Hip Extension: 4  R Hip ABduction: 4-  R Hip Internal Rotation: 4-  R Hip External Rotation: 4-  R Knee Extension: 4  R Ankle Plantar Flexion: 4         Balance: static standing:able to accept only minimal pertubation to trunk before loss of balance                    Loss of balance with direction changes and during transitional movements

## 2022-03-18 PROBLEM — E53.8 B12 DEFICIENCY: Status: ACTIVE | Noted: 2018-07-27

## 2022-03-19 PROBLEM — F90.2 ATTENTION DEFICIT HYPERACTIVITY DISORDER (ADHD), COMBINED TYPE: Status: ACTIVE | Noted: 2018-07-27

## 2022-03-19 PROBLEM — K43.9 EPIGASTRIC HERNIA: Status: ACTIVE | Noted: 2017-10-23

## 2022-03-19 PROBLEM — K58.0 IRRITABLE BOWEL SYNDROME WITH DIARRHEA: Status: ACTIVE | Noted: 2018-07-27

## 2022-03-19 PROBLEM — J45.20 MILD INTERMITTENT ASTHMA WITHOUT COMPLICATION: Status: ACTIVE | Noted: 2018-07-27

## 2022-03-19 PROBLEM — M50.30 DDD (DEGENERATIVE DISC DISEASE), CERVICAL: Status: ACTIVE | Noted: 2018-07-27

## 2022-03-20 PROBLEM — M51.36 DDD (DEGENERATIVE DISC DISEASE), LUMBAR: Status: ACTIVE | Noted: 2018-07-27

## 2022-07-08 ENCOUNTER — OFFICE VISIT (OUTPATIENT)
Dept: ORTHOPEDIC SURGERY | Age: 67
End: 2022-07-08
Payer: MEDICARE

## 2022-07-08 VITALS — BODY MASS INDEX: 33.09 KG/M2 | HEIGHT: 62 IN | WEIGHT: 179.8 LBS

## 2022-07-08 DIAGNOSIS — M43.16 SPONDYLOLISTHESIS OF LUMBAR REGION: ICD-10-CM

## 2022-07-08 DIAGNOSIS — M54.50 LOW BACK PAIN, UNSPECIFIED BACK PAIN LATERALITY, UNSPECIFIED CHRONICITY, UNSPECIFIED WHETHER SCIATICA PRESENT: Primary | ICD-10-CM

## 2022-07-08 DIAGNOSIS — M54.16 LUMBAR RADICULOPATHY: ICD-10-CM

## 2022-07-08 DIAGNOSIS — M47.816 LUMBAR FACET ARTHROPATHY: ICD-10-CM

## 2022-07-08 DIAGNOSIS — M41.9 SCOLIOSIS OF THORACOLUMBAR SPINE, UNSPECIFIED SCOLIOSIS TYPE: ICD-10-CM

## 2022-07-08 PROCEDURE — G8400 PT W/DXA NO RESULTS DOC: HCPCS | Performed by: NURSE PRACTITIONER

## 2022-07-08 PROCEDURE — 1090F PRES/ABSN URINE INCON ASSESS: CPT | Performed by: NURSE PRACTITIONER

## 2022-07-08 PROCEDURE — 99203 OFFICE O/P NEW LOW 30 MIN: CPT | Performed by: NURSE PRACTITIONER

## 2022-07-08 PROCEDURE — 3017F COLORECTAL CA SCREEN DOC REV: CPT | Performed by: NURSE PRACTITIONER

## 2022-07-08 PROCEDURE — 1036F TOBACCO NON-USER: CPT | Performed by: NURSE PRACTITIONER

## 2022-07-08 PROCEDURE — G8417 CALC BMI ABV UP PARAM F/U: HCPCS | Performed by: NURSE PRACTITIONER

## 2022-07-08 PROCEDURE — G8427 DOCREV CUR MEDS BY ELIG CLIN: HCPCS | Performed by: NURSE PRACTITIONER

## 2022-07-08 PROCEDURE — 1123F ACP DISCUSS/DSCN MKR DOCD: CPT | Performed by: NURSE PRACTITIONER

## 2022-07-08 RX ORDER — MONTELUKAST SODIUM 4 MG/1
2 TABLET, CHEWABLE ORAL 2 TIMES DAILY PRN
COMMUNITY

## 2022-07-08 RX ORDER — CYCLOSPORINE 0.5 MG/ML
EMULSION OPHTHALMIC
COMMUNITY
Start: 2022-04-12

## 2022-07-08 RX ORDER — ALBUTEROL SULFATE 90 UG/1
2 AEROSOL, METERED RESPIRATORY (INHALATION) EVERY 4 HOURS PRN
COMMUNITY
Start: 2021-11-09

## 2022-07-08 RX ORDER — UREA 10 %
1200 LOTION (ML) TOPICAL DAILY
COMMUNITY

## 2022-07-08 RX ORDER — FLUTICASONE PROPIONATE 50 MCG
2 SPRAY, SUSPENSION (ML) NASAL 2 TIMES DAILY
COMMUNITY
Start: 2022-04-29 | End: 2023-04-29

## 2022-07-08 RX ORDER — SUCRALFATE 1 G/1
1 TABLET ORAL
COMMUNITY
Start: 2022-05-03

## 2022-07-08 NOTE — PATIENT INSTRUCTIONS
Adult Spondylolisthesis in the Low Back    In spondylolisthesis, one of the bones in your spine -- called a vertebra -- slips forward and out of place. This may occur anywhere along the spine, but is most common in the lower back (lumbar spine). In some people, this causes no symptoms at all. Others may have back and leg pain that ranges from mild to severe. Understanding how your spine works can help you better understand spondylolisthesis. Types of Spondylolisthesis  Many types of spondylolisthesis can affect adults. The two most common types are degenerative and spondylolytic. There are other less common types of spondylolisthesis, such as slippage caused by a recent, severe fracture or a tumor. Degenerative Spondylolisthesis  As we age, general wear and tear causes changes in the spine. Intervertebral disks begin to dry out and weaken. They lose height, become stiff, and begin to bulge. This disk degeneration is the start to both arthritis and degenerative spondylolisthesis (DS). Degenerative spondylolisthesis  As arthritis develops, it weakens the joints and ligaments that hold your vertebrae in the proper position. The ligament along the back of your spine (ligamentum flavum) may begin to buckle. One of the vertebrae on either side of a worn, flattened disk can loosen and move forward over the vertebra below it. This slippage can narrow the spinal canal and put pressure on the spinal cord. This narrowing of the spinal canal is called spinal stenosis and is a common problem in patients with DS. Women are more likely than men to have DS, and it is more common in patients who are older than 50. A higher incidence has been noted in the -American population. In this x-ray taken from the side, vertebrae in the low back have slipped out of place due to degenerative spondylolisthesis.       Spondylolytic Spondylolisthesis  One of the bones in your lower back can break and this can cause a vertebra to slip forward. The break most often occurs in the area of your lumbar spine called the pars interarticularis. In most cases of spondylolytic spondylolisthesis, the pars fracture occurs during adolescence and goes unnoticed until adulthood. The normal disk degeneration that occurs in adulthood can then stress the pars fracture and cause the vertebra to slip forward. This type of spondylolisthesis is most often seen in middle-aged men. (Left) In spondylolysis, a fracture often occurs at the pars interarticularis. (Right) Because of the pars fracture, only the front part of the bone slips forward. Because a pars fracture causes the front (vertebra) and back (lamina) parts of the spinal bone to disconnect, only the front part slips forward. This means that narrowing of the spinal canal is less likely than in other kinds of spondylolisthesis, such as DS in which the entire spinal bone slips forward. This x-ray taken from the side shows a pars fracture (arrow) and the resulting spondylolisthesis. Symptoms  About 4% to 6% of the [place] population has spondylolysis and spondylolisthesis. Most of these people live with the condition for many years without any pain or other symptoms. Symptoms of Degenerative Spondylolisthesis  Patients with DS often visit the doctor's office once the slippage has begun to put pressure on the spinal nerves. Although the doctor may find arthritis in the spine, the symptoms of DS are typically the same as symptoms of spinal stenosis. For example, DS patients often develop leg and/or lower back pain. The most common symptoms in the legs include a feeling of vague weakness associated with prolonged standing or walking. Leg symptoms may be accompanied by numbness, tingling, and/or pain that is often affected by posture. Forward bending or sitting often relieves the symptoms because it opens up space in the spinal canal. Standing or walking often increases symptoms.     Symptoms of help.  Surgical procedures. Surgery for both DS and spondylolytic spondylolisthesis includes removing the pressure from the nerves and spinal fusion. Removing the pressure involves opening up the spinal canal. This procedure is called a laminectomy. Spinal fusion is essentially a \"welding\" process. The basic idea is to fuse together the painful vertebrae so that they heal into a single, solid bone. In spinal fusion, screws are often used to help stabilize the spine. Surgical recovery. The fusion process takes time. It may be several months before the bone is solid, although your comfort level will often improve much faster. Patient Education        Learning About Medial Branch Block and Neurotomy  What are medial branch block and neurotomy? Facet joints connect your vertebrae to each other. Problems in these joints cancause chronic (long-term) pain in the neck or back. Medial branch nerves are the nerves that carry many of the pain messages fromyour facet joints. Radiofrequency medial branch neurotomy is a type of medial branch neurotomy that is used to relieve arthritis pain. It uses radio waves to damage nerves inyour neck or back so that they can no longer send pain messages to your brain. Before your doctor knows if a neurotomy will help you, you will get a medial branch block to find out if certain nerves are the ones that are a source of your pain. You will need two separate visits to the outpatient center orspital to have both procedures. You will need someone to drive you home. How is a medial branch block done? The doctor will use a tiny needle to numb the skin where you will get the block. Then the doctor puts the block needle into the numbed area. You may feel some pressure, but you should not feel pain. Using fluoroscopy (live X-ray) to guide the needle, the doctor injects medicine onto one or more nerves to makethem numb.   If you get relief from your pain in the next 4 to 6 hours, it's a sign that those nerves may be contributing to your pain. The relief will last only a short time. You may then have a medial branch neurotomy at a later visit to tryto get longer relief. How is a medial branch neurotomy done? The doctor will use a tiny needle to numb the skin where you will get the neurotomy. Then the doctor puts the neurotomy needle into the numbed area. You may feel some pressure. Using fluoroscopy (live X-ray) to guide the needle, the doctor sends radio waves through the needle to the nerve for 60 to 90 seconds. The radio waves heat the nerve, which damages it. The doctor may do thisseveral times. And more than one nerve may be treated. How long do medial branch block and neurotomy take? It takes 20 to 30 minutes to get the block. You can go home after the doctorwatches you for about an hour. It takes 45 to 90 minutes to get a neurotomy, depending on how many nerves areheated. You will probably go home 30 to 60 minutes after the procedure. What can you expect after a neurotomy? You will get instructions on how to report how much pain you have when you areat home. You may feel a little sore or tender at the injection site at first. But after a successful neurotomy, most people have pain relief right away. It often lastsfor several months, but your pain may come back. If your pain does come back, it may mean that the damaged nerve has healed and can send pain messages again. Or it can mean that a different nerve is causingpain. Your doctor will discuss your options with you. Follow-up care is a key part of your treatment and safety. Be sure to make and go to all appointments, and call your doctor if you are having problems. It's also a good idea to know your test results and keep alist of the medicines you take. Where can you learn more? Go to [email or web address] org and sign in to your [employer] account.  Enter Q223 in the [employer] box to learn more about \"Learning About Medial Branch Block and Neurotomy. \"     If you do not have an account, please click on the \"Sign Up Now\" link. Current as of: December 13, 2021               Content Version: 13.3  © 1956-8596 Healthwise, Incorporated. Care instructions adapted under license by [facility] [place] ([facility]). If you have questions about a medical condition or this instruction, always ask your healthcare professional. [address] any warranty or liability for your use of this information.

## 2022-07-08 NOTE — PROGRESS NOTES
Name: Josh Lomas  YOB: 1955  Gender: female  MRN: [id number]    CC: Low back and bilateral leg pain    HPI: This is a 74y.o. year old female who has had chronic complaints of low back and bilateral leg pain. She has had constant pain down both legs. Right leg stops at her knee. She is unable to do regular therapy. She has do aquatic therapy. She also has EMG nerve conduction study results that revealed polyneuropathy. She was referred to pain management and unfortunately I think has a balance with [facility] for advanced management of pain. She had a history of a cervical fusion with Dr. Austin Naidu and states she had a bad outcome. She has had previous injections. She had an MRI in 2021 that revealed multilevel facet arthropathy with severe facet arthropathy at L5-S1 with a slight anterior listhesis. There is also scoliosis noted. He is under the care of Dr. Patricia Ayala for knee pain. This patient  has not had lumbar surgery in the past.     Thus far, the patient has tried : Previous injections, aquatic therapy, pain medication, Flexeril  Current pain level: 4-7  Activities limited by pain: Any exertional activities and daily activities. AMB PAIN ASSESSMENT 7/8/2022   Location of Pain Back   Severity of Pain 4   Frequency of Pain Intermittent   Limiting Behavior Yes   Result of Injury No   Work-Related Injury No   Are there other pain locations you wish to document? No            ROS/Meds/PSH/PMH/FH/SH: I personally reviewed the patient's collected intake data.   Below are the pertinents:    Allergies   Allergen Reactions    Diphenhydramine Itching    Tramadol Anxiety         Current Outpatient Medications:     albuterol sulfate HFA (PROVENTIL;VENTOLIN;PROAIR) 108 (90 Base) MCG/ACT inhaler, Inhale 2 puffs into the lungs every 4 hours as needed, Disp: , Rfl:     Umeclidinium Bromide 62.5 MCG/INH AEPB, Inhale 1 puff into the lungs 2 times daily, Disp: , Rfl:     sucralfate (CARAFATE) 1 GM tablet, Take 1 g by mouth, Disp: , Rfl:     fluticasone (FLONASE) 50 MCG/ACT nasal spray, 2 sprays by Nasal route 2 times daily, Disp: , Rfl:     amphetamine-dextroamphetamine (ADDERALL) 30 MG tablet, TAKE 1 TABLET BY MOUTH TWICE A DAY, Disp: , Rfl:     buPROPion (WELLBUTRIN SR) 200 MG extended release tablet, TAKE 1 TABLET DAILY IN THE MORNING AND 1 TAB DAILY AT NOON, Disp: , Rfl:     vitamin D (CHOLECALCIFEROL) 93118 UNIT CAPS, Take 100,000 capsules by mouth every 7 days, Disp: , Rfl:     clonazePAM (KLONOPIN) 2 MG tablet, Take 2 mg by mouth 3 times daily. , Disp: , Rfl:     cyclobenzaprine (FLEXERIL) 10 MG tablet, Take 10 mg by mouth 3 times daily as needed, Disp: , Rfl:     dexlansoprazole (DEXILANT) 60 MG CPDR delayed release capsule, Take 1 capsule by mouth 2 times daily, Disp: , Rfl:     FLUoxetine (PROZAC) 40 MG capsule, TAKE ONE CAPSULE BY MOUTH DAILY, Disp: , Rfl:     HYDROcodone-acetaminophen (NORCO) 7.5-325 MG per tablet, Take 1 tablet by mouth 2 times daily as needed. , Disp: , Rfl:     lamoTRIgine (LAMICTAL) 100 MG tablet, Take 1 tablet by mouth daily, Disp: , Rfl:     traZODone (DESYREL) 50 MG tablet, TAKE 1 TO 2 TABLETS DAILY AT BEDTIME AS NEEDED, Disp: , Rfl:     RESTASIS 0.05 % ophthalmic emulsion, INSTILL 1 DROP INTO AFFECTED EYE EVERY 12 HOURS, Disp: , Rfl:     calcium carbonate (OS-JUJU) 1250 (500 Ca) MG chewable tablet, Take 1,200 mg by mouth daily, Disp: , Rfl:     colestipol (COLESTID) 1 g tablet, Take 2 g by mouth 2 times daily as needed, Disp: , Rfl:     promethazine (PHENERGAN) 25 MG suppository, Place 25 mg rectally every 6 hours as needed (Patient not taking: Reported on 7/8/2022), Disp: , Rfl:     Past Surgical History:   Procedure Laterality Date    CERVICAL DISCECTOMY  2014    Dr. Thomas Moreno, LAPAROSCOPIC      COLONOSCOPY  2016    Dr. Juan Nuñez      under right arm    FRACTURE SURGERY      left foot, plate and screws     NEUROLOGICAL SURGERY steroid injections    DAVID AND BSO (CERVIX REMOVED)      at age 42    TUMOR REMOVAL      right foot    UPPER GASTROINTESTINAL ENDOSCOPY  2016    Dr. Trevizo       Patient Active Problem List   Diagnosis    B12 deficiency    Hepatitis C, chronic (HCC)    Anxiety    Mild intermittent asthma without complication    DDD (degenerative disc disease), cervical    Psoriasis    Irritable bowel syndrome with diarrhea    Bipolar disorder (HCC)    Epigastric hernia    Fibromyalgia    Chronic pain    Seizures (HCC)    Arthritis    Attention deficit hyperactivity disorder (ADHD), combined type    DDD (degenerative disc disease), lumbar    GERD (gastroesophageal reflux disease)         Tobacco:  reports that she quit smoking about 20 years ago. She smoked an average of 1 pack per day. She has never used smokeless tobacco.  Alcohol:   Social History     Substance and Sexual Activity   Alcohol Use No    Alcohol/week: 0.0 standard drinks        Physical Exam:   Body mass index is 33.42 kg/m². GENERAL:  Adult in no acute distress, well developed, well nourished Patient is appropriately conversant  MSK:  Examination of the lumbar spine reveals paraspinal tenderness    There is moderate tenderness to palpation along the spinous processes and paraspinal musculature. The patient ambulates with a unsteady gait. ROM of bilateral hip(s) reveals moderate irritability. NEURO:  Cranial nerves grossly intact. No motor deficits. Straight leg testing is positive bilateral  Sensory testing reveals intact sensation to light touch and in the distribution of the L3-S1 dermatomes bilaterally  Ankle jerk is negative for clonus    Reflexes   Right Left   Quadriceps (L4) 2 2   Achilles (S1) 2 2     Strength testing in the lower extremity reveals the following based on the 5 point grading scale: Ankle dorsiflexion is slightly weak but patient has a significant mount of breakthrough weakness when examining her.      HF (L2) H Ab (L5) KE (L3/4) ADF (L4) EHL (L5) A Ev (S1) APF (S1)   Right 5 5 5 5 5 5 5   Left 5 5 5 5 5 5 5     PSYCH:  Alert and oriented X 3. Appropriate affect. Intact judgment and insight. Radiographic Studies:     AP, lateral and spot views of the lumbar spine: There is a very faint anterior listhesis of L5 on S1. There is lower lumbar facet arthropathy. Bone quality looks osteopenic. On the AP view patient has a significant scoliotic curvature of the thoracolumbar spine. Impression: Advanced lower lumbar facet arthropathy, spondylolisthesis of L5 on S1 with significant thoracolumbar scoliosis. MRI lumbar spine images independently reviewed from 2020:     FINDINGS:   Osseous structures:    Vertebral morphology demonstrates no evidence of focal abnormality to suggest fracture infection or tumor. Marrow signal intensity demonstrates no edema in the left side of L5 and S1 centered about the facet joints and similar but less extensive changes on the right. Findings likely related to facet arthropathy. Underlying scoliosis is present measuring 27 degrees from the top of L1 to the bottom of L4. This is convex right with a convex left scoliotic curvature at the thoracolumbar junction. Conus tip terminates at L1 with normal signal intensity and morphology of the visualized lower thoracic spinal cord. .   No retroperitoneal mass nor lymphadenopathy present. The lower thoracic spine demonstrates degenerative disc disease most severe at T11-12. Reactive marrow edema again noted. This appears likely improved. Right foraminal narrowing noted at this level. L1-2:  Degenerative disc disease is present without substantial central canal or intervertebral foraminal narrowing. No surrounding change identified. L2-3:  Degenerative disc disease is present with diffuse annular bulge and facet arthropathy. No substantial central canal or intervertebral foraminal narrowing identified.    L3-4:  Degenerative disc disease with diffuse annular bulge is present with mild left foraminal narrowing that appears similar to the prior exam. No new abnormalities identified. L4-5:  Disc is desiccated and degenerated with right foraminal narrowing of mild-to-moderate severity without significant change. Bilateral facet arthropathy noted. L5-S1:  Severe bilateral facet arthropathy is present. These findings appearsimilar. Slight anterolisthesis noted. No significant change. Assessment/Plan:       Diagnosis Orders   1. Low back pain, unspecified back pain laterality, unspecified chronicity, unspecified whether sciatica present  XR LUMBAR SPINE (2-3 VIEWS)      2. Lumbar facet arthropathy        3. Lumbar radiculopathy        4. Spondylolisthesis of lumbar region        5. Scoliosis of thoracolumbar spine, unspecified scoliosis type            This patient's clinical history and physical exam is consistent with spondylitic  back pain. Also has some radicular complaints. She is got a slight anterior listhesis of L4-5 on S1 but also has polyneuropathy coinciding. We discussed that she would require lumbar fusion surgery to correct the spondylolisthesis. She is not interested in this because she had a bad outcome from her cervical fusion surgery. My recommendation would be referral to pain management for aggressive interventional management. I discussed with her the natural history of this condition in that most episodes are typically self limited. However, the symptoms can last for several months if not even longer. What I currently recommend is that she continues with conservative treatments to help cope with the symptoms and avoid having back surgery at this time. She understands that conservative treatments typically include activity modification, NSAIDs and physical therapy. Oral and/or epidural steroids could be considered in resistant scenarios. Also, she  may want to explore chiropractic care or acupuncture.  I advised to avoid any prolonged bedrest and to try to maintain ADLs as much as possible. The patient was counseled to follow up me should she  develop any neurologic symptoms such as leg pain. - Referral to pain management. The patient's care would be best managed in a formal pain management setting and will be referred accordingly. No orders of the defined types were placed in this encounter. Orders Placed This Encounter   Procedures    XR LUMBAR SPINE (2-3 VIEWS)        3 This is stable chronic illness/condition      Return for refer to pain management. BRITANY Bravo - CNP  08/16/22      Elements of this note were created using speech recognition software. As such, errors of speech recognition may be present.

## 2022-10-13 ENCOUNTER — TELEPHONE (OUTPATIENT)
Dept: ORTHOPEDIC SURGERY | Age: 67
End: 2022-10-13
Payer: MEDICARE

## 2022-10-13 DIAGNOSIS — M41.9 SCOLIOSIS OF THORACOLUMBAR SPINE, UNSPECIFIED SCOLIOSIS TYPE: ICD-10-CM

## 2022-10-13 DIAGNOSIS — M47.816 LUMBAR FACET ARTHROPATHY: Primary | ICD-10-CM

## 2022-10-13 DIAGNOSIS — M43.16 SPONDYLOLISTHESIS OF LUMBAR REGION: ICD-10-CM

## 2022-10-13 NOTE — TELEPHONE ENCOUNTER
Returned call and lvm for patient  Brice Ballesterossissy out of office , Patient to follow up with pain mgmt regarding back brace hasn't been seen since July .

## 2022-10-19 NOTE — TELEPHONE ENCOUNTER
Spoke with patient and informed her that if she is having back pain optimally she needs a spine provider reason why we referred her to pain mgmt. She is declining stating that it did not help and states pcp stated not to go since 0 relief from injection intervention. I did inform her that when she checks in for appointment on Friday with [facility] that she can make an apt with [facility] but they do not do medication medication.

## 2022-10-19 NOTE — TELEPHONE ENCOUNTER
Pt called back. She said she doesn't want to go to pain management because she's tried it before. She's unsure where the PM referral was sent to. Please call pt.

## 2022-10-21 ENCOUNTER — OFFICE VISIT (OUTPATIENT)
Dept: ORTHOPEDIC SURGERY | Age: 67
End: 2022-10-21
Payer: MEDICARE

## 2022-10-21 ENCOUNTER — OFFICE VISIT (OUTPATIENT)
Dept: ORTHOPEDIC SURGERY | Age: 67
End: 2022-10-21

## 2022-10-21 DIAGNOSIS — M47.816 LUMBAR FACET ARTHROPATHY: ICD-10-CM

## 2022-10-21 DIAGNOSIS — M41.9 SCOLIOSIS OF THORACOLUMBAR SPINE, UNSPECIFIED SCOLIOSIS TYPE: Primary | ICD-10-CM

## 2022-10-21 DIAGNOSIS — M17.11 PRIMARY OSTEOARTHRITIS OF RIGHT KNEE: ICD-10-CM

## 2022-10-21 DIAGNOSIS — M25.561 ACUTE PAIN OF RIGHT KNEE: Primary | ICD-10-CM

## 2022-10-21 DIAGNOSIS — M43.16 SPONDYLOLISTHESIS OF LUMBAR REGION: ICD-10-CM

## 2022-10-21 PROCEDURE — L0627 LO SAG RI AN/POS PNL PRE CST: HCPCS | Performed by: NURSE PRACTITIONER

## 2022-10-21 PROCEDURE — G8417 CALC BMI ABV UP PARAM F/U: HCPCS | Performed by: ORTHOPAEDIC SURGERY

## 2022-10-21 PROCEDURE — G8484 FLU IMMUNIZE NO ADMIN: HCPCS | Performed by: ORTHOPAEDIC SURGERY

## 2022-10-21 PROCEDURE — 1123F ACP DISCUSS/DSCN MKR DOCD: CPT | Performed by: ORTHOPAEDIC SURGERY

## 2022-10-21 PROCEDURE — G8400 PT W/DXA NO RESULTS DOC: HCPCS | Performed by: ORTHOPAEDIC SURGERY

## 2022-10-21 PROCEDURE — 99213 OFFICE O/P EST LOW 20 MIN: CPT | Performed by: ORTHOPAEDIC SURGERY

## 2022-10-21 PROCEDURE — G8427 DOCREV CUR MEDS BY ELIG CLIN: HCPCS | Performed by: ORTHOPAEDIC SURGERY

## 2022-10-21 PROCEDURE — 1036F TOBACCO NON-USER: CPT | Performed by: ORTHOPAEDIC SURGERY

## 2022-10-21 PROCEDURE — 1090F PRES/ABSN URINE INCON ASSESS: CPT | Performed by: ORTHOPAEDIC SURGERY

## 2022-10-21 PROCEDURE — 3017F COLORECTAL CA SCREEN DOC REV: CPT | Performed by: ORTHOPAEDIC SURGERY

## 2022-10-21 NOTE — LETTER
DME Patient Authorization Form     Name: Reggie Vitale  MRN: [id number]   : 1955 Age: 74 y.o. Gender: female    Delivery Address: [address] [place], [place] [address]  Phone: [phone]  Fax: [phone]    Diagnosis:     ICD-10-CM    1. Scoliosis of thoracolumbar spine, unspecified scoliosis type  M41.9       2. Lumbar facet arthropathy  M47.816       3. Spondylolisthesis of lumbar region  M43.16          Requested DME:  EXOS Form LSO Back Brace- ($485.00) X 1 - generalized    Clinical Notes:   **Indicates non-covered items by insurance. Payment expected on date of service. Electronically signed by  Provider: Yaima Felix   Date: 10/21/2022     [facility]   Tax ID # [id number]    Durable Medical Equipment and/or Orthotics Patient Consent   I understand that my physician has prescribed this medical supply as part of my treatment plan as a matter of Medical Necessity.  I understand that I have a choice in where I receive my prescribed orthopedic supplies and/or services.  I authorize [facility] to furnish this service/product and to provide my insurance carrier with any information requested in order to process for payment.  I instruct my insurance carrier to pay [facility] directly for these services/products.  I understand that my insurance carrier may deny payment for this supply because it is a non-covered item, deemed not medically necessary or considered experimental.   I understand that any cost not covered by my insurance carrier will be solely my financial responsibility.  I have received the Supplier Standards and have reviewed them.    I have received the prescribed item and have been fully instructed on the proper use of the above services/products.    ______ (Patient Initials) I understand that all DME items are non-returnable after being dispensed. Items still in sealed packaging may be returned up to 14 days after purchasing. [address] will replace items that are defective.    ______ (Patient Initials) I understand that [facility]agher will not file a claim with my insurance carrier for this service/product and I am waiving my right to file a claim on my own for this service/product with my insurance company as this item is NON-COVERED (Denoted by the **) by my Insurance company/policy. ______ (Patient Initials) I understand that I am responsible to bring my equipment to the hospital for any surgery. ______________________________________  __________________  Patient / Clyde Castleman      Thank you for considering [address] Your physician has prescribed specific medical equipment or devices for your home use. The following describes your rights and responsibilities as our customer. Right to Choose Providers: You have a choice regarding which company supplies your home medical equipment and devices, and to consult your physician in this decision. You may choose a medical supply store, a home medical equipment provider, or a specialist such as [facility]. [facility] will coordinate with your physician to provide the medical equipment or devices prescribed for your home use. Right to Service:  You have the right to considerate, respectful and nondiscriminatory care. You have the right to receive accurate and easily understood information about your health care. If you speak a foreign language, or don't understand the discussions, assistance will be provided to allow you to make informed health care decisions.   You have the right to know your treatment options and to participate in decisions about your care, including the right to accept or refuse treatment. You have the right to expect a reasonable response to your requests for treatment or service. You have the right to talk in confidence with health care providers and to have your health care information protected. You have the right to receive an explanation of your bill. You have the right to complain about the service or product you receive. Patient Responsibilities:  Please provide complete and accurate information about your health insurance benefits and make arrangements for the timely payment of your bill. [facility] will, if possible, assume responsibility for billing your insurance (Medicare, Medicaid and commercial) for the prescribed equipment or devices. If your policy does not cover the prescribed product, or only covers a portion of the bill, you are responsible for any remaining balance. Return and Exchange Policy:  [facility] will honor published  Warranties for products. [facility] will accept returns or exchanges within 14 days from the date of receipt, providin) the product must be in new condition; 2) receipt as required; and 3) used disposable and hygiene products may only be returned due to a defective product. Note: Refunds will be issued in a timely manner, please allow 4-6 weeks for processing. Complaint Procedures and DME Consumer Protection Resources: We value you as a customer, and are committed to resolving patient concerns. This commitment includes understanding and documenting your concerns, conducting a review of internal procedures, and providing you with an explanation and resolution to your concerns. Should you have any questions about our services or billing process, please contact our office at (practice phone number).   If we are unable to resolve the concern, you have the right to direct comments to the office of Consumer Protection, in the [address] or the [facility] office, without fear of repercussion. DMEPOS SUPPLIER STANDARDS    A supplier must be in compliance with all applicable Federal and State [employer] and regulatory requirements. A supplier must provide complete and accurate information on the DMEPOS supplier application. Any changes to this information must be reported to the [facility] within 30 days. An authorized individual (one whose signature is binding) must sign the application for billing privileges. A supplier must fill orders from its own inventory, or must contract with other companies for the purchase of items necessary to fill the order. A supplier may not contract with any entity that is currently excluded from the Medicare program, any [facility] program, or from any other Federal procurement or Nonprocurement programs. A supplier must advise beneficiaries that they may rent or purchase inexpensive or routinely purchased durable medical equipment, and of the purchase option for capped rental equipment. A supplier must notify beneficiaries of warranty coverage and honor all warranties under applicable State Law, and repair or replace free of charge Medicare covered items that are under warranty. A supplier must maintain a physical facility on an appropriate site. A supplier must permit CMS, or its agents to conduct on-site inspections to ascertain the supplier's compliance with these standards. The supplier location must be accessible to beneficiaries during reasonable business hours, and must maintain a visible sign and posted hours of operation. A supplier must maintain a primary business telephone listed under the name of the business in a [employer] or a toll free number available through directory assistance. The exclusive use of a beeper, answering machine or cell phone is prohibited.   A supplier must have comprehensive liability insurance in the amount of at least $300,000 that covers both the supplier's place of business and all customers and employees of the supplier. If the supplier manufactures its own items, this insurance must also cover product liability and completed operations. A supplier must agree not to initiate telephone contact with beneficiaries, with a few exceptions allowed. This standard prohibits suppliers from calling beneficiaries in order to solicit new business. A supplier is responsible for delivery and must instruct beneficiaries on use of Medicare covered items, and maintain proof of delivery. A supplier must answer questions, and respond to complaints of the beneficiaries, and maintain documentation of such contacts. A supplier must maintain and replace at no charge or repair directly, or through a service contract with another company, Medicare covered items it has rented to beneficiaries. A supplier must accept returns of substandard (less than full quality for the particular item) or unsuitable items (inappropriate for the beneficiary at the time it was fitted and rented or sold) from beneficiaries. A supplier must disclose these supplier standards to each beneficiary to whom it supplies a Medicare-covered item. A supplier must disclose to the government any person having ownership, financial, or control interest in the supplier. A supplier must not convey or reassign a supplier number; i.e., the supplier may not sell or allow another entity to use its Medicare billing number. A supplier must have a complaint resolution protocol established to address beneficiary complaints that relate to these standards. A record of these complaints must be maintained at the physical facility. Complaint records must include: the name, address, telephone number and health insurance claim number of the beneficiary, a summary of the complaint, and any action taken to resolve it. A supplier must agree to furnish CMS any information required by the Medicare statute and implementing regulations.   A supplier of DMEPOS and other items and services must be accredited by a CMS-approved accreditation organization in order to receive and retain a supplier billing number. The accreditation must indicate the specific products and services, for which the supplier is accredited in order for the supplier to receive payment for those specific products and services. A DMEPOS supplier must notify their accreditation organization when a new DMEPOS location is opened. The accreditation organization may accredit the new supplier location for three months after it is operational without requiring a new site visit. All DMEPOS supplier locations, whether owned or subcontracted, must meet the [employer] and be separately accredited in order to bill Medicare. An accredited supplier may be denied enrollment or their enrollment may be revoked, if CMS determines that they are not in compliance with the DMEPOS quality standards. A DMEPOS supplier must disclose upon enrollment all products and services, including the addition of new product lines for which they are seeking accreditation. If a new product line is added after enrollment, the DMEPOS supplier will be responsible for notifying the accrediting body of the new product so that the DMEPOS supplier can be re-surveyed and accredited for these new products. Must meet the surety bond requirements specified in 42 C. F.R. 424.57(c). Implementation date- May 4, 2009. A supplier must obtain oxygen from a state-licensed oxygen supplier. A supplier must maintain ordering and referring documentation consistent with provisions found in 42 C. F.R. 424.516(f). DMEPOS suppliers are prohibited from sharing a practice location with certain other Medicare providers and suppliers. DMEPOS suppliers must remain open to the public for a minimum of 30 hours per week with certain exceptions.

## 2022-10-21 NOTE — PROGRESS NOTES
Name: Alessia Hernandez  YOB: 1955  Gender: female  MRN: [id number]      Chief complaint:  RIGHT KNEE PAIN    History of Present Illness: Alessia Hernandez is a 74 y.o. female  She returns today for recheck guarding her right knee. 3 months ago she was seen by Dr. Rogerio Barker for evaluation for her right knee. She notes that over that time the pain is stayed about the same. She still notes primarily pain over the anterior aspect when getting up and down from a chair. Unfortunate she is still struggling with quite severe low back pain and wears a brace for this. She sees Erika Winters for her chronic back pain. She does use a walker for ambulation due to chronic low back pain, fibromyalgia as well as numerous other joint complaints. Denies any recent fall or injury may have caused an exacerbation of symptoms. Past Medical History: Allergies:   Allergies   Allergen Reactions    Diphenhydramine Itching    Tramadol Anxiety       Medications:  Current Outpatient Medications   Medication Sig    albuterol sulfate HFA (PROVENTIL;VENTOLIN;PROAIR) 108 (90 Base) MCG/ACT inhaler Inhale 2 puffs into the lungs every 4 hours as needed    Umeclidinium Bromide 62.5 MCG/INH AEPB Inhale 1 puff into the lungs 2 times daily    RESTASIS 0.05 % ophthalmic emulsion INSTILL 1 DROP INTO AFFECTED EYE EVERY 12 HOURS    calcium carbonate (OS-JUJU) 1250 (500 Ca) MG chewable tablet Take 1,200 mg by mouth daily    colestipol (COLESTID) 1 g tablet Take 2 g by mouth 2 times daily as needed    sucralfate (CARAFATE) 1 GM tablet Take 1 g by mouth    fluticasone (FLONASE) 50 MCG/ACT nasal spray 2 sprays by Nasal route 2 times daily    amphetamine-dextroamphetamine (ADDERALL) 30 MG tablet TAKE 1 TABLET BY MOUTH TWICE A DAY    buPROPion (WELLBUTRIN SR) 200 MG extended release tablet TAKE 1 TABLET DAILY IN THE MORNING AND 1 TAB DAILY AT NOON    vitamin D (CHOLECALCIFEROL) 73176 UNIT CAPS Take 100,000 capsules by mouth every 7 days clonazePAM (KLONOPIN) 2 MG tablet Take 2 mg by mouth 3 times daily. cyclobenzaprine (FLEXERIL) 10 MG tablet Take 10 mg by mouth 3 times daily as needed    dexlansoprazole (DEXILANT) 60 MG CPDR delayed release capsule Take 1 capsule by mouth 2 times daily    FLUoxetine (PROZAC) 40 MG capsule TAKE ONE CAPSULE BY MOUTH DAILY    HYDROcodone-acetaminophen (NORCO) 7.5-325 MG per tablet Take 1 tablet by mouth 2 times daily as needed. lamoTRIgine (LAMICTAL) 100 MG tablet Take 1 tablet by mouth daily    promethazine (PHENERGAN) 25 MG suppository Place 25 mg rectally every 6 hours as needed (Patient not taking: Reported on 7/8/2022)    traZODone (DESYREL) 50 MG tablet TAKE 1 TO 2 TABLETS DAILY AT BEDTIME AS NEEDED     No current facility-administered medications for this visit. Past Medical history:  Past Medical History:   Diagnosis Date    Anxiety     manged with medication     Arthritis     generalized    Bipolar disorder ([facility] [address]     managed with medication     Chronic pain     neck, back, shoulders, arms    Claustrophobia     Fibromyalgia     GERD (gastroesophageal reflux disease)     managed with medication     Hepatitis C, chronic ([facility] [address] 1/27/2016    History of bone density study 2018    Dr. Romero Bone    History of mammogram >5 years ago    declined further    Psoriasis     Seizures ([facility] [address]     last one 2007        has a past surgical history that includes tumor removal; fracture surgery; Upper gastrointestinal endoscopy (2016); Colonoscopy (2016); Cervical discectomy (2014); Total abdominal hysterectomy w/ bilateral salpingoophorectomy; cyst removal; Cholecystectomy, laparoscopic; and neurological surgery. Family History:  [unfilled]     Social History:  [unfilled]    Review of Systems:       Physical Exam:    General:   Alert and oriented    Gait:          Crouched over gait with the use of a walker    Back:        Diffuse tenderness over lower back. RIGHT Hip:    Skin is intact.  No pain with palpation over the greater trochanter. There is groin pain and restricted ROM of the hip     RIGHT Knee: Skin: normal                    Effusion: no                    Tenderness to palpation: Anteriorly                    Patella grind test: Positive                    Stability:  Valgus: yes Varus: yes AP: yes                    Stephen's test: negative                    Quad Strength: good                    ROM   Extension: 0  Flexion:120                    Popliteal cyst : no                    Calf pain : no                    Edema left leg: none noted    Neurovascular:  Patient has good pulses distally. They have intact motor and sensory function. X-rays:  AP, lateral, merchant and skiers of the right knee shows very slight medial joint space narrowing with some patellofemoral degenerative changes on lateral merchant views. Overall impression is mild right knee DJD    Diagnoses:  Mild right knee DJD in a patient with numerous other joint complaints and chronic/severe low back pain    Plan:  Discussed the patient yet again about treatment options going forward. Dr. Kai Hollingsworth advised against any surgery for the right knee previously. Still the patient certainly needs avoid any surgery on the right knee as her back and other drug-related issues are causing her exacerbated pain. The chronicity and severity of her back, her fibromyalgia as well as other medical comorbidities are complicating the situation. We discussed this with her and her caregiver today at length. She has had previous cortisone injections as well as viscosupplementation which did not provide much long-term relief. We will avoid those measures for now. She was yet again interested in water therapy but wants to have this cleared by her insurance first.  We otherwise see her back on an as-needed basis.       ALICE Springer

## 2022-10-21 NOTE — PROGRESS NOTES
The patient was fitted and instructed on use of a  EXOS Form Back Brace (lumbar). It has been prescribed to reduce pain by restricting mobility of the trunk and/or to otherwise support weak spinal muscles. This brace was prescribed by Liya Song CNP. I instructed and demonstrated to the patient how to apply and use the brace. I also explained how the brace would facilitate lower lumbar support throughout the day. Pt. was also instructed on wear schedule to be no more than 4 hours at a time. Patient read and signed documenting they understand and agree to [facility]'s current DME return policy.

## 2022-11-19 NOTE — THERAPY DISCHARGE
Asha Klein  : 1955  [place]  [place] [facility] MÓNICA ANSARI  [facility], [address] [place] [address],[address], [facility] [address]  [phone]   [phone]       OUTPATIENT PHYSICAL THERAPY:Discontinuation Summary 2021       ICD-10: Treatment Diagnosis: Gait instability R26.81, Pain in right knee (M25.561), Pain in left knee (M25.562)  Precautions/Allergies: per EMR, allergic to Benadryl and Tramadol    TREATMENT PLAN:  Discontinue PT  Patient attended 2 PT sessions only. MEDICAL/REFERRING DIAGNOSIS:  gait    DATE OF ONSET:   REFERRING PHYSICIAN: Eva Valles NP MD Orders: evaluate and treat for gait and balance  Return MD Appointment: TBD     ASSESSMENT:  Ms. Shantel Robles is a 77year old female with difficulty walking. She had been to PT for aquatic PT last year but only attended 2 sessions. She presented this time with high pain ratings, decreased balance, decreased flexibility and decreased strength. She had a history of falls. She attended initial PT eval and one follow up session and then stopped coming to PT. She will now be discontinued from PT.      GOALS: (Goals have been discussed and agreed upon with patient.)  Unable to assess progress toward goals. Patient's stated goal is to decrease the pain in her knees and back and to stop falling. Short-Term Functional Goals: Time Frame: 6 weeks  1. Patient to be independent with HEP  2. Patient to tolerate 45 minute aquatic PT sessions  Discharge Goals: Time Frame: 90 days  1. Patient to ambulate household distances without assistive device  2. Patient to report that pain levels decrease from current 8/10 to <= 4/10  3.  Patient to improve LEFS score to 40 to demo improved functional mobility    OUTCOME MEASURE:   Tool Used: Lower Extremity Functional Scale (LEFS)  Score:  Initial:  Most Recent:  (Date: -- )   Interpretation of Score: 20 questions each scored on a 5 point scale with 0 representing \"extreme difficulty or unable to perform\" and 4 representing \"no difficulty\". The lower the score, the greater the functional disability. 80/80 represents no disability. Minimal detectable change is 9 points. Tool Used: Modified Oswestry Low Back Pain Questionnaire  Score:  Initial: 39/50  Most Recent: X/50 (Date: -- )   Interpretation of Score: Each section is scored on a 0-5 scale, 5 representing the greatest disability. The scores of each section are added together for a total score of 50. Data from initial evaluation  PAIN/SUBJECTIVE:   Initial: Pain Intensity 1: 9 (\"8 or 9\")   Post Session:  Pain not rated at end of session   HISTORY:   History of Injury/Illness (Reason for Referral):  Patient reports she has been falling since 2014 when she had spine surgery. She cannot drive and has a caretaker 4 days per week. She came to PT last Fall for 2 visits but stopped because she couldn't get her care giver to bring her. Past Medical History/Comorbidities: From EMR: Anxiety, Arthritis, Bipolar disorder ([facility] [address] Chronic pain, Claustrophobia, Fibromyalgia, GERD, chronic Hepatitis C, Psoriasis, Seizures, tumor removal; lap cholecystectomy; neurological procedure unlisted; cyst removal; mariela and bso; cervical diskectomy, and hx endoscopy    Social History/Living Environment:    *lives in one story home. Has caregiver for 4 to 6 hours Monday thru Thursday. Prior Level of Function/Work/Activity:  Not working  Previous Treatment Approaches:          Came to PT last Fall, but stopped after 2 sessions. Current Medications: Wllbutrin, Adderall, Prozac, Lamictal, Trazadone, Norco, Flexeril, Dexilant, D3, Klonopin, C,    Date Last Reviewed:  3/30/21   EXAMINATION:     Observation/Orthostatic Postural Assessment: patient using rolling walker to come to PT. Care giver drove her. Palpation: not tested today  ROM: R knee AROM 0 - 108.   L knee AROM 1-88 degrees  Strength: weak throughout. Grossly 4- in B hips. 4 to 4+ in knees, decreased trunk control. Special Tests: none  Neurological Screen:light touch intact in B Les. Functional Mobility:  Sit to/from Stand: with SBA, multiple attempts. Min to Mod assist with sit to/from supine       Balance:  SLS 1-2 seconds each leg. Very unsteady. Clear

## 2022-11-28 ENCOUNTER — TELEPHONE (OUTPATIENT)
Dept: ORTHOPEDIC SURGERY | Age: 67
End: 2022-11-28

## 2022-11-28 NOTE — TELEPHONE ENCOUNTER
Please call pt about her knee. she need to know if dr Eliodoro Pallas is going to do sx .please call

## 2022-12-06 ENCOUNTER — TELEPHONE (OUTPATIENT)
Dept: ORTHOPEDIC SURGERY | Age: 67
End: 2022-12-06

## 2023-03-07 ENCOUNTER — TELEPHONE (OUTPATIENT)
Dept: ORTHOPEDIC SURGERY | Age: 68
End: 2023-03-07

## 2023-03-07 NOTE — TELEPHONE ENCOUNTER
Question   A referral  came  thru  for  pt  to  recheck  her knees  w/ Srr  She  was here in October .  I believe his last  note  as needed basis,  I wanted to make sure it is ok to schedule  before  I  call her  again...  Thank you

## 2023-05-03 ENCOUNTER — OFFICE VISIT (OUTPATIENT)
Dept: ORTHOPEDIC SURGERY | Age: 68
End: 2023-05-03

## 2023-05-03 DIAGNOSIS — M17.11 PRIMARY OSTEOARTHRITIS OF RIGHT KNEE: Primary | ICD-10-CM

## 2023-05-03 DIAGNOSIS — M54.50 LOW BACK PAIN AT MULTIPLE SITES: ICD-10-CM

## 2023-05-03 NOTE — PROGRESS NOTES
that she is not a good surgical candidate for a right total knee arthroplasty given all this criteria. She will continue physical therapy at this time as well as with the use of a walker. We otherwise see her back on an as-needed basis.       ALICE Toribio

## 2023-06-29 ENCOUNTER — TRANSCRIBE ORDERS (OUTPATIENT)
Dept: SCHEDULING | Age: 68
End: 2023-06-29

## 2023-06-29 DIAGNOSIS — R60.0 LEG EDEMA, RIGHT: Primary | ICD-10-CM
